# Patient Record
Sex: FEMALE | Race: WHITE | NOT HISPANIC OR LATINO | Employment: OTHER | URBAN - METROPOLITAN AREA
[De-identification: names, ages, dates, MRNs, and addresses within clinical notes are randomized per-mention and may not be internally consistent; named-entity substitution may affect disease eponyms.]

---

## 2018-05-15 ENCOUNTER — TRANSCRIBE ORDERS (OUTPATIENT)
Dept: URGENT CARE | Facility: CLINIC | Age: 73
End: 2018-05-15

## 2018-05-15 ENCOUNTER — APPOINTMENT (OUTPATIENT)
Dept: LAB | Facility: CLINIC | Age: 73
End: 2018-05-15
Payer: COMMERCIAL

## 2018-05-15 DIAGNOSIS — I10 ESSENTIAL HYPERTENSION, BENIGN: Primary | ICD-10-CM

## 2018-05-15 DIAGNOSIS — I10 ESSENTIAL HYPERTENSION, BENIGN: ICD-10-CM

## 2018-05-15 LAB
ALBUMIN SERPL BCP-MCNC: 3.5 G/DL (ref 3.5–5)
ALP SERPL-CCNC: 55 U/L (ref 46–116)
ALT SERPL W P-5'-P-CCNC: 29 U/L (ref 12–78)
ANION GAP SERPL CALCULATED.3IONS-SCNC: 7 MMOL/L (ref 4–13)
AST SERPL W P-5'-P-CCNC: 20 U/L (ref 5–45)
BASOPHILS # BLD AUTO: 0.08 THOUSANDS/ΜL (ref 0–0.1)
BASOPHILS NFR BLD AUTO: 1 % (ref 0–1)
BILIRUB SERPL-MCNC: 0.43 MG/DL (ref 0.2–1)
BUN SERPL-MCNC: 18 MG/DL (ref 5–25)
CALCIUM SERPL-MCNC: 9.4 MG/DL (ref 8.3–10.1)
CHLORIDE SERPL-SCNC: 106 MMOL/L (ref 100–108)
CHOLEST SERPL-MCNC: 175 MG/DL (ref 50–200)
CO2 SERPL-SCNC: 25 MMOL/L (ref 21–32)
CREAT SERPL-MCNC: 0.88 MG/DL (ref 0.6–1.3)
EOSINOPHIL # BLD AUTO: 0.33 THOUSAND/ΜL (ref 0–0.61)
EOSINOPHIL NFR BLD AUTO: 6 % (ref 0–6)
ERYTHROCYTE [DISTWIDTH] IN BLOOD BY AUTOMATED COUNT: 13 % (ref 11.6–15.1)
GFR SERPL CREATININE-BSD FRML MDRD: 66 ML/MIN/1.73SQ M
GLUCOSE P FAST SERPL-MCNC: 100 MG/DL (ref 65–99)
HCT VFR BLD AUTO: 46.5 % (ref 34.8–46.1)
HDLC SERPL-MCNC: 78 MG/DL (ref 40–60)
HGB BLD-MCNC: 15.1 G/DL (ref 11.5–15.4)
IMM GRANULOCYTES # BLD AUTO: 0.01 THOUSAND/UL (ref 0–0.2)
IMM GRANULOCYTES NFR BLD AUTO: 0 % (ref 0–2)
LDLC SERPL CALC-MCNC: 81 MG/DL (ref 0–100)
LYMPHOCYTES # BLD AUTO: 1.87 THOUSANDS/ΜL (ref 0.6–4.47)
LYMPHOCYTES NFR BLD AUTO: 32 % (ref 14–44)
MCH RBC QN AUTO: 30.8 PG (ref 26.8–34.3)
MCHC RBC AUTO-ENTMCNC: 32.5 G/DL (ref 31.4–37.4)
MCV RBC AUTO: 95 FL (ref 82–98)
MONOCYTES # BLD AUTO: 0.64 THOUSAND/ΜL (ref 0.17–1.22)
MONOCYTES NFR BLD AUTO: 11 % (ref 4–12)
NEUTROPHILS # BLD AUTO: 2.93 THOUSANDS/ΜL (ref 1.85–7.62)
NEUTS SEG NFR BLD AUTO: 50 % (ref 43–75)
NONHDLC SERPL-MCNC: 97 MG/DL
NRBC BLD AUTO-RTO: 0 /100 WBCS
PLATELET # BLD AUTO: 309 THOUSANDS/UL (ref 149–390)
PMV BLD AUTO: 11.3 FL (ref 8.9–12.7)
POTASSIUM SERPL-SCNC: 4 MMOL/L (ref 3.5–5.3)
PROT SERPL-MCNC: 7.4 G/DL (ref 6.4–8.2)
RBC # BLD AUTO: 4.9 MILLION/UL (ref 3.81–5.12)
SODIUM SERPL-SCNC: 138 MMOL/L (ref 136–145)
TRIGL SERPL-MCNC: 80 MG/DL
TSH SERPL DL<=0.05 MIU/L-ACNC: 3.01 UIU/ML (ref 0.36–3.74)
WBC # BLD AUTO: 5.86 THOUSAND/UL (ref 4.31–10.16)

## 2018-05-15 PROCEDURE — 36415 COLL VENOUS BLD VENIPUNCTURE: CPT

## 2018-05-15 PROCEDURE — 85025 COMPLETE CBC W/AUTO DIFF WBC: CPT

## 2018-05-15 PROCEDURE — 80061 LIPID PANEL: CPT

## 2018-05-15 PROCEDURE — 80053 COMPREHEN METABOLIC PANEL: CPT

## 2018-05-15 PROCEDURE — 84443 ASSAY THYROID STIM HORMONE: CPT

## 2018-09-21 ENCOUNTER — TRANSCRIBE ORDERS (OUTPATIENT)
Dept: LAB | Facility: CLINIC | Age: 73
End: 2018-09-21

## 2018-09-21 ENCOUNTER — APPOINTMENT (OUTPATIENT)
Dept: LAB | Facility: CLINIC | Age: 73
End: 2018-09-21
Payer: COMMERCIAL

## 2018-09-21 DIAGNOSIS — I10 ESSENTIAL HYPERTENSION, MALIGNANT: Primary | ICD-10-CM

## 2018-09-21 DIAGNOSIS — R73.01 IMPAIRED FASTING GLUCOSE: ICD-10-CM

## 2018-09-21 LAB
ALBUMIN SERPL BCP-MCNC: 3.4 G/DL (ref 3.5–5)
ALP SERPL-CCNC: 54 U/L (ref 46–116)
ALT SERPL W P-5'-P-CCNC: 26 U/L (ref 12–78)
ANION GAP SERPL CALCULATED.3IONS-SCNC: 7 MMOL/L (ref 4–13)
AST SERPL W P-5'-P-CCNC: 21 U/L (ref 5–45)
BASOPHILS # BLD AUTO: 0.08 THOUSANDS/ΜL (ref 0–0.1)
BASOPHILS NFR BLD AUTO: 1 % (ref 0–1)
BILIRUB SERPL-MCNC: 0.64 MG/DL (ref 0.2–1)
BUN SERPL-MCNC: 21 MG/DL (ref 5–25)
CALCIUM SERPL-MCNC: 9 MG/DL (ref 8.3–10.1)
CHLORIDE SERPL-SCNC: 105 MMOL/L (ref 100–108)
CHOLEST SERPL-MCNC: 175 MG/DL (ref 50–200)
CO2 SERPL-SCNC: 27 MMOL/L (ref 21–32)
CREAT SERPL-MCNC: 0.84 MG/DL (ref 0.6–1.3)
EOSINOPHIL # BLD AUTO: 0.32 THOUSAND/ΜL (ref 0–0.61)
EOSINOPHIL NFR BLD AUTO: 5 % (ref 0–6)
ERYTHROCYTE [DISTWIDTH] IN BLOOD BY AUTOMATED COUNT: 12.6 % (ref 11.6–15.1)
GFR SERPL CREATININE-BSD FRML MDRD: 70 ML/MIN/1.73SQ M
GLUCOSE P FAST SERPL-MCNC: 94 MG/DL (ref 65–99)
HCT VFR BLD AUTO: 47.8 % (ref 34.8–46.1)
HDLC SERPL-MCNC: 75 MG/DL (ref 40–60)
HGB BLD-MCNC: 15.8 G/DL (ref 11.5–15.4)
IMM GRANULOCYTES # BLD AUTO: 0.01 THOUSAND/UL (ref 0–0.2)
IMM GRANULOCYTES NFR BLD AUTO: 0 % (ref 0–2)
LDLC SERPL CALC-MCNC: 80 MG/DL (ref 0–100)
LYMPHOCYTES # BLD AUTO: 1.76 THOUSANDS/ΜL (ref 0.6–4.47)
LYMPHOCYTES NFR BLD AUTO: 29 % (ref 14–44)
MCH RBC QN AUTO: 31.4 PG (ref 26.8–34.3)
MCHC RBC AUTO-ENTMCNC: 33.1 G/DL (ref 31.4–37.4)
MCV RBC AUTO: 95 FL (ref 82–98)
MONOCYTES # BLD AUTO: 0.64 THOUSAND/ΜL (ref 0.17–1.22)
MONOCYTES NFR BLD AUTO: 11 % (ref 4–12)
NEUTROPHILS # BLD AUTO: 3.23 THOUSANDS/ΜL (ref 1.85–7.62)
NEUTS SEG NFR BLD AUTO: 54 % (ref 43–75)
NONHDLC SERPL-MCNC: 100 MG/DL
NRBC BLD AUTO-RTO: 0 /100 WBCS
PLATELET # BLD AUTO: 294 THOUSANDS/UL (ref 149–390)
PMV BLD AUTO: 10.9 FL (ref 8.9–12.7)
POTASSIUM SERPL-SCNC: 4.1 MMOL/L (ref 3.5–5.3)
PROT SERPL-MCNC: 7.2 G/DL (ref 6.4–8.2)
RBC # BLD AUTO: 5.03 MILLION/UL (ref 3.81–5.12)
SODIUM SERPL-SCNC: 139 MMOL/L (ref 136–145)
TRIGL SERPL-MCNC: 99 MG/DL
TSH SERPL DL<=0.05 MIU/L-ACNC: 3.08 UIU/ML (ref 0.36–3.74)
WBC # BLD AUTO: 6.04 THOUSAND/UL (ref 4.31–10.16)

## 2018-09-21 PROCEDURE — 80053 COMPREHEN METABOLIC PANEL: CPT

## 2018-09-21 PROCEDURE — 36415 COLL VENOUS BLD VENIPUNCTURE: CPT

## 2018-09-21 PROCEDURE — 80061 LIPID PANEL: CPT

## 2018-09-21 PROCEDURE — 85025 COMPLETE CBC W/AUTO DIFF WBC: CPT

## 2018-09-21 PROCEDURE — 84443 ASSAY THYROID STIM HORMONE: CPT

## 2018-09-21 PROCEDURE — 83036 HEMOGLOBIN GLYCOSYLATED A1C: CPT

## 2018-09-22 LAB
EST. AVERAGE GLUCOSE BLD GHB EST-MCNC: 105 MG/DL
HBA1C MFR BLD: 5.3 % (ref 4.2–6.3)

## 2021-10-05 ENCOUNTER — OFFICE VISIT (OUTPATIENT)
Dept: URGENT CARE | Facility: CLINIC | Age: 76
End: 2021-10-05
Payer: COMMERCIAL

## 2021-10-05 VITALS
HEIGHT: 62 IN | OXYGEN SATURATION: 98 % | TEMPERATURE: 98.2 F | BODY MASS INDEX: 28.89 KG/M2 | DIASTOLIC BLOOD PRESSURE: 65 MMHG | HEART RATE: 78 BPM | RESPIRATION RATE: 15 BRPM | SYSTOLIC BLOOD PRESSURE: 137 MMHG | WEIGHT: 157 LBS

## 2021-10-05 DIAGNOSIS — L08.9 LACERATION OF FINGER WITH INFECTION, INITIAL ENCOUNTER: Primary | ICD-10-CM

## 2021-10-05 DIAGNOSIS — S61.219A LACERATION OF FINGER WITH INFECTION, INITIAL ENCOUNTER: Primary | ICD-10-CM

## 2021-10-05 PROCEDURE — 99214 OFFICE O/P EST MOD 30 MIN: CPT | Performed by: NURSE PRACTITIONER

## 2021-10-05 PROCEDURE — S9088 SERVICES PROVIDED IN URGENT: HCPCS | Performed by: NURSE PRACTITIONER

## 2021-10-05 RX ORDER — CLINDAMYCIN HYDROCHLORIDE 300 MG/1
300 CAPSULE ORAL 3 TIMES DAILY
Qty: 21 CAPSULE | Refills: 0 | Status: SHIPPED | OUTPATIENT
Start: 2021-10-05 | End: 2021-10-12

## 2021-10-05 RX ORDER — LOSARTAN POTASSIUM 50 MG/1
50 TABLET ORAL DAILY
COMMUNITY
Start: 2021-09-06

## 2021-10-07 ENCOUNTER — TELEPHONE (OUTPATIENT)
Dept: URGENT CARE | Facility: CLINIC | Age: 76
End: 2021-10-07

## 2022-06-13 NOTE — PROGRESS NOTES
PT Evaluation     Today's date: 2022  Patient name: Ricco Pennington  : 1945  MRN: [id number]  Referring provider: ARACELIS Jurado  Dx:   Encounter Diagnosis     ICD-10-CM    1  Cervicalgia  M54 2                   Assessment  Assessment details: The patient is a 79 y/o female who presents to PT with diagnosis of cervicalgia  She has complaints of intermittent pain along the R side of her neck  She denies any radiating arm pain, denies UE N&T  Her BUE ROM and strength is WFL at this time  She demonstrates deficits with decreased C/S ROM and strength, decreased postural awareness, decreased flexibility, muscle tightness and pain with completing her ADLs and tasks at home  She remains I with all her ADLs though she has pain with completing them  She has increased tightness and pain with activities in which she has to turn her head, such as when driving the car  No TTP is noted along C/S and periscapular muscles though muscle tightness is noted along R side  No difficulty sleeping at this time  The patient would benefit from continued PT to address deficits and improve function  Tx to include ROM, stretching, strengthening, modalities, HEP, pt education, postural ed, lifting/body mechanics, neuro re-ed, balance/proprioception Te, MT and equipment  Impairments: abnormal or restricted ROM, activity intolerance, impaired physical strength, lacks appropriate home exercise program, pain with function, poor posture  and poor body mechanics  Other impairment: decreased flexibility  Understanding of Dx/Px/POC: good   Prognosis: good    Goals  STGs:  1  Initiate and complete HEP with verbal cues  2   Improve C/S ROM by 5-10 degrees in 4 weeks  3   Decrease C/S pain by > 25% in 4 weeks  LTGs:  1  Patient to be I with HEP in 6 weeks  2   Improve C/S ROM to WNL t/o in 6 weeks to improve function  3   Decrease neck pain to < or = to 1-2/10 with activity in 6 weeks to improve function    4  Postural control is improved to maximal level of function in 6 weeks  5   Recreational performance is improved to PLOF in 6 weeks  6   ADL performance is improved to PLOF in 6 weeks  Plan  Plan details: Modalities and therapy interventions prn  Patient would benefit from: skilled physical therapy  Planned modality interventions: cryotherapy and thermotherapy: hydrocollator packs  Planned therapy interventions: manual therapy, behavior modification, body mechanics training, neuromuscular re-education, patient education, postural training, self care, strengthening, stretching, therapeutic activities, therapeutic exercise, flexibility and home exercise program  Frequency: 2x week  Duration in weeks: 6  Plan of Care beginning date: 2022  Plan of Care expiration date: 2022  Treatment plan discussed with: patient        Subjective Evaluation    History of Present Illness  Mechanism of injury: The patient states that she developed neck pain about one year ago in April  She notes that it had been getting hard to turn her neck and her R shoulder had been "drooping down "  She notes that the pain had been on and off and had started to get more consistent last fall  She had spent the winter in [place] and the doctor out there wasn't taking any more patients so she had waited until she came home to go to the doctor  She had seen her PCP the end of May  She had x-rays taken, per patient they showed arthritis  She was also given a muscle relaxer which she had started to take at bedtime  She notes that this has helped with the stiffness and pain in her neck  She will be going back to see her PCP as needed for her follow up appointment  The patient states that she has neck pain along the R side of her neck, no L side neck pain  She denies any radiating arm pain, denies N&T into RUE      Pain  At best pain ratin  At worst pain rating: 3  Location: Neck Pain - R side   Quality: dull ache and tight    Social Support    Employment status: not working  Hand dominance: right      Diagnostic Tests  X-ray: abnormal (Degenerative changes C4-C6)  Patient Goals  Patient goals for therapy: increased motion, decreased pain and increased strength  Patient goal: "To get some exercises to do at home "         Objective     Concurrent Complaints  Negative for disturbed sleep and headaches    Static Posture     Head  Forward  Shoulders  Rounded  Postural Observations  Seated posture: fair        Palpation   Left   No palpable tenderness to the cervical interspinals, cervical paraspinals, levator scapulae, middle trapezius, rhomboids, suboccipitals and upper trapezius  Right   No palpable tenderness to the cervical interspinals, cervical paraspinals, levator scapulae, middle trapezius, rhomboids, suboccipitals and upper trapezius  Hypertonic in the levator scapulae and upper trapezius  Active Range of Motion   Cervical/Thoracic Spine       Cervical    Flexion: 40 degrees   Extension: 45 degrees      Left lateral flexion: 30 degrees      Right lateral flexion: 30 degrees      Left rotation: 50 degrees  Right rotation: 50 degrees         Left Shoulder   Flexion: WFL  External rotation BTH: WFL    Right Shoulder   Flexion: WFL  External rotation BTH: WFL    Strength/Myotome Testing     Left Shoulder     Planes of Motion   Flexion: WFL   [facility]   Internal rotation at 0°: WFL     Right Shoulder     Planes of Motion   Flexion: [facility]   External rotation at 0°: [facility]   Internal rotation at 0°: [facility]     Tests   Cervical   Positive neck flexor muscle endurance test   Negative vertical compression and cervical distraction  Lumbar   Negative vertical compression     Neuro Exam:     Headaches   Patient reports headaches: No                  Precautions: HTN       Manuals 6/14       IASTM R Neck/UT region                                Neuro Re-Ed         MTP/LTP        [employer] on Wall Postural Ed  ML               Ther Ex        UBE Retro        C/S ROM        Shrugs/Rolls HEP       Retractions HEP       UT Stretch HEP       Levator Stretch HEP       Doorway Stretch HEP       [place] ER w/TBand        Supine Punch        S/L ER & Abd        Wall Pushups                                Ther Activity                        Gait Training                        Modalities        HP/CP prn                           Access Code: [email or web address]  URL: [email or web address]  Date: 06/14/2022  Prepared by: Isha Eastern    Exercises  · Seated Shoulder Shrugs - 1 x daily - 7 x weekly - 1 sets - 10 reps  · Seated Scapular Retraction - 1 x daily - 7 x weekly - 1 sets - 10 reps  · Doorway Pec Stretch at 60 Elevation - 1 x daily - 7 x weekly - 1 sets - 5 reps - 20" hold  · Seated Upper Trapezius Stretch - 1 x daily - 7 x weekly - 1 sets - 5 reps - 20" hold  · Seated Levator Scapulae Stretch - 1 x daily - 7 x weekly - 1 sets - 5 reps - 20" hold

## 2022-06-14 ENCOUNTER — EVALUATION (OUTPATIENT)
Dept: PHYSICAL THERAPY | Facility: CLINIC | Age: 77
End: 2022-06-14
Payer: COMMERCIAL

## 2022-06-14 DIAGNOSIS — M54.2 CERVICALGIA: Primary | ICD-10-CM

## 2022-06-14 PROCEDURE — 97161 PT EVAL LOW COMPLEX 20 MIN: CPT | Performed by: PHYSICAL THERAPIST

## 2022-06-14 PROCEDURE — 97112 NEUROMUSCULAR REEDUCATION: CPT | Performed by: PHYSICAL THERAPIST

## 2022-06-14 PROCEDURE — 97535 SELF CARE MNGMENT TRAINING: CPT | Performed by: PHYSICAL THERAPIST

## 2022-06-14 NOTE — LETTER
Elizabeth 15, 2022    [address] ,  [facility] [address]    Patient: Sg Lindsay   YOB: 1945   Date of Visit: 2022     Encounter Diagnosis     ICD-10-CM    1  Cervicalgia  M54 2        Dear Dr Eric Cooper: Thank you for your recent referral of Sg Lindsay  Please review the attached evaluation summary from Mirella's recent visit  Please verify that you agree with the plan of care by signing the attached order  If you have any questions or concerns, please do not hesitate to call  I sincerely appreciate the opportunity to share in the care of one of your patients and hope to have another opportunity to work with you in the near future  Sincerely,    Donna Tam, PT      Referring Provider:      I certify that I have read the below Plan of Care and certify the need for these services furnished under this plan of treatment while under my care  [address] ,  [facility] [address]  Via Fax: [phone]          PT Evaluation     Today's date: 2022  Patient name: Sg Lindsay  : 1945  MRN: [id number]  Referring provider: ARACELIS Champagne  Dx:   Encounter Diagnosis     ICD-10-CM    1  Cervicalgia  M54 2                   Assessment  Assessment details: The patient is a 79 y/o female who presents to PT with diagnosis of cervicalgia  She has complaints of intermittent pain along the R side of her neck  She denies any radiating arm pain, denies UE N&T  Her BUE ROM and strength is WFL at this time  She demonstrates deficits with decreased C/S ROM and strength, decreased postural awareness, decreased flexibility, muscle tightness and pain with completing her ADLs and tasks at home  She remains I with all her ADLs though she has pain with completing them    She has increased tightness and pain with activities in which she has to turn her head, such as when driving the car  No TTP is noted along C/S and periscapular muscles though muscle tightness is noted along R side  No difficulty sleeping at this time  The patient would benefit from continued PT to address deficits and improve function  Tx to include ROM, stretching, strengthening, modalities, HEP, pt education, postural ed, lifting/body mechanics, neuro re-ed, balance/proprioception Te, MT and equipment  Impairments: abnormal or restricted ROM, activity intolerance, impaired physical strength, lacks appropriate home exercise program, pain with function, poor posture  and poor body mechanics  Other impairment: decreased flexibility  Understanding of Dx/Px/POC: good   Prognosis: good    Goals  STGs:  1  Initiate and complete HEP with verbal cues  2   Improve C/S ROM by 5-10 degrees in 4 weeks  3   Decrease C/S pain by > 25% in 4 weeks  LTGs:  1  Patient to be I with HEP in 6 weeks  2   Improve C/S ROM to WNL t/o in 6 weeks to improve function  3   Decrease neck pain to < or = to 1-2/10 with activity in 6 weeks to improve function  4   Postural control is improved to maximal level of function in 6 weeks  5   Recreational performance is improved to PLOF in 6 weeks  6   ADL performance is improved to PLOF in 6 weeks  Plan  Plan details: Modalities and therapy interventions prn      Patient would benefit from: skilled physical therapy  Planned modality interventions: cryotherapy and thermotherapy: hydrocollator packs  Planned therapy interventions: manual therapy, behavior modification, body mechanics training, neuromuscular re-education, patient education, postural training, self care, strengthening, stretching, therapeutic activities, therapeutic exercise, flexibility and home exercise program  Frequency: 2x week  Duration in weeks: 6  Plan of Care beginning date: 6/14/2022  Plan of Care expiration date: 7/26/2022  Treatment plan discussed with: patient        Subjective Evaluation    History of Present Illness  Mechanism of injury: The patient states that she developed neck pain about one year ago in April  She notes that it had been getting hard to turn her neck and her R shoulder had been "drooping down "  She notes that the pain had been on and off and had started to get more consistent last fall  She had spent the winter in [place] and the doctor out there wasn't taking any more patients so she had waited until she came home to go to the doctor  She had seen her PCP the end of May  She had x-rays taken, per patient they showed arthritis  She was also given a muscle relaxer which she had started to take at bedtime  She notes that this has helped with the stiffness and pain in her neck  She will be going back to see her PCP as needed for her follow up appointment  The patient states that she has neck pain along the R side of her neck, no L side neck pain  She denies any radiating arm pain, denies N&T into RUE  Pain  At best pain ratin  At worst pain rating: 3  Location: Neck Pain - R side   Quality: dull ache and tight    Social Support    Employment status: not working  Hand dominance: right      Diagnostic Tests  X-ray: abnormal (Degenerative changes C4-C6)  Patient Goals  Patient goals for therapy: increased motion, decreased pain and increased strength  Patient goal: "To get some exercises to do at home "         Objective     Concurrent Complaints  Negative for disturbed sleep and headaches    Static Posture     Head  Forward  Shoulders  Rounded  Postural Observations  Seated posture: fair        Palpation   Left   No palpable tenderness to the cervical interspinals, cervical paraspinals, levator scapulae, middle trapezius, rhomboids, suboccipitals and upper trapezius       Right   No palpable tenderness to the cervical interspinals, cervical paraspinals, levator scapulae, middle trapezius, rhomboids, suboccipitals and upper trapezius  Hypertonic in the levator scapulae and upper trapezius  Active Range of Motion   Cervical/Thoracic Spine       Cervical    Flexion: 40 degrees   Extension: 45 degrees      Left lateral flexion: 30 degrees      Right lateral flexion: 30 degrees      Left rotation: 50 degrees  Right rotation: 50 degrees         Left Shoulder   Flexion: WFL  External rotation BTH: WFL    Right Shoulder   Flexion: WFL  External rotation BTH: WFL    Strength/Myotome Testing     Left Shoulder     Planes of Motion   Flexion: WFL   [facility] 0°: [facility]   Internal rotation at 0°: WFL     Right Shoulder     Planes of Motion   Flexion: [facility]   External rotation at 0°: [facility]   Internal rotation at 0°: [facility]     Tests   Cervical   Positive neck flexor muscle endurance test   Negative vertical compression and cervical distraction  Lumbar   Negative vertical compression  Neuro Exam:     Headaches   Patient reports headaches: No                  Precautions: HTN       Manuals 6/14       IASTM R Neck/UT region                                Neuro Re-Ed         MTP/LTP        [employer] on [employer]                                Postural Ed  ML               Ther Ex        UBE Retro        C/S ROM        Shrugs/Rolls HEP       Retractions HEP       UT Stretch HEP       Levator Stretch HEP       Doorway Stretch HEP       D[place] ER w/TBand        Supine Punch        S/L ER & Abd        Wall Pushups                                Ther Activity                        Gait Training                        Modalities        HP/CP prn                           Access Code: [email or web address]  URL: [email or web address]  Date: 06/14/2022  Prepared by: Sigrid Gentile    Exercises  · Seated Shoulder Shrugs - 1 x daily - 7 x weekly - 1 sets - 10 reps  · Seated Scapular Retraction - 1 x daily - 7 x weekly - 1 sets - 10 reps  · Doorway Pec Stretch at 60 Elevation - 1 x daily - 7 x weekly - 1 sets - 5 reps - 20" hold  · Seated Upper Trapezius Stretch - 1 x daily - 7 x weekly - 1 sets - 5 reps - 20" hold  · Seated Levator Scapulae Stretch - 1 x daily - 7 x weekly - 1 sets - 5 reps - 20" hold

## 2022-06-17 ENCOUNTER — OFFICE VISIT (OUTPATIENT)
Dept: PHYSICAL THERAPY | Facility: CLINIC | Age: 77
End: 2022-06-17
Payer: COMMERCIAL

## 2022-06-17 DIAGNOSIS — M54.2 CERVICALGIA: Primary | ICD-10-CM

## 2022-06-17 PROCEDURE — 97110 THERAPEUTIC EXERCISES: CPT

## 2022-06-17 PROCEDURE — 97140 MANUAL THERAPY 1/> REGIONS: CPT

## 2022-06-17 PROCEDURE — 97112 NEUROMUSCULAR REEDUCATION: CPT

## 2022-06-17 NOTE — PROGRESS NOTES
Daily Note     Today's date: 2022  Patient name: Luisa Mixon  : 1945  MRN: [id number]  Referring provider: ARACELIS Beasley  Dx:   Encounter Diagnosis     ICD-10-CM    1  Cervicalgia  M54 2                   Subjective: Pt reported that her neck continues to be very sore  Has been compliant with her HEP  She denies all contraindications to IASTM prior to Tx  Objective: See treatment diary below      Assessment: Initiated multiple exercises as outlines in pt POC  Tolerated treatment well  Patient demonstrated fatigue post treatment, exhibited good technique with therapeutic exercises and would benefit from continued PT  No adverse affect to IASTM post Tx  Plan: Continue per plan of care  Progress treatment as tolerated            Precautions: HTN       Manuals       IASTM R Neck/UT region  TM                              Neuro Re-Ed         MTP/LTP  GTB 5" x10 ea      [employer] on Wall                                Postural Ed  ML               Ther Ex        UBE Retro  120 RPM x6 min      C/S ROM        Shrugs/Rolls HEP x20      Retractions HEP 5" x20      UT Stretch HEP 10" x5 ea      Levator Stretch HEP 10" x5 ea      Doorway Stretch HEP 20" x4       Dew[place]ne ER w/TBand  GTB 5" x10      Supine Punch  W/ cane 5" x20      S/L ER & Abd  S/L ER 5" x20      Wall Pushups                                Ther Activity                        Gait Training                        Modalities        HP/CP prn

## 2022-06-20 ENCOUNTER — OFFICE VISIT (OUTPATIENT)
Dept: PHYSICAL THERAPY | Facility: CLINIC | Age: 77
End: 2022-06-20
Payer: COMMERCIAL

## 2022-06-20 DIAGNOSIS — M54.2 CERVICALGIA: Primary | ICD-10-CM

## 2022-06-20 PROCEDURE — 97110 THERAPEUTIC EXERCISES: CPT | Performed by: PHYSICAL THERAPIST

## 2022-06-20 PROCEDURE — 97140 MANUAL THERAPY 1/> REGIONS: CPT | Performed by: PHYSICAL THERAPIST

## 2022-06-20 NOTE — PROGRESS NOTES
Daily Note     Today's date: 2022  Patient name: Jeannette Hammonds  : 1945  MRN: [id number]  Referring provider: ARACELIS Pritchett  Dx:   Encounter Diagnosis     ICD-10-CM    1  Cervicalgia  M54 2                   Subjective: Patient reports she has been taking the muscle relaxer as prescribed and this seems to be helping  Objective: See treatment diary below      Assessment: Tolerated treatment well  Patient requires cueing and demonstration for recall of exercises  Patient demonstrated fatigue post treatment, exhibited good technique with therapeutic exercises and would benefit from continued PT      Plan: Continue per plan of care  Progress treatment as tolerated            Precautions: HTN       Manuals      IASTM R Neck/UT region  TM KG                             Neuro Re-Ed         MTP/LTP  GTB 5" x10 ea Green TB 5" x10 ea     Ball Roll on Wall                                Postural Ed  ML               Ther Ex        UBE Retro  120 RPM x6 min 120 rpm 4'fwd/4'retro     C/S ROM        Shrugs/Rolls HEP x20      Retractions HEP 5" x20 5" x20     UT Stretch HEP 10" x5 ea 10"x5 ea     Levator Stretch HEP 10" x5 ea 10" x5 ea     Doorway Stretch HEP 20" x4  20"x4     Dew[place]ne ER w/TBand  GTB 5" x10 Green TB 5" x10     Supine Punch  W/ cane 5" x20 W/cane 5" x20     S/L ER & Abd  S/L ER 5" x20 x20 ea ER and abd     Wall Pushups                                Ther Activity                        Gait Training                        Modalities        HP/CP prn

## 2022-06-27 ENCOUNTER — OFFICE VISIT (OUTPATIENT)
Dept: PHYSICAL THERAPY | Facility: CLINIC | Age: 77
End: 2022-06-27
Payer: COMMERCIAL

## 2022-06-27 DIAGNOSIS — M54.2 CERVICALGIA: Primary | ICD-10-CM

## 2022-06-27 PROCEDURE — 97140 MANUAL THERAPY 1/> REGIONS: CPT | Performed by: PHYSICAL THERAPIST

## 2022-06-27 PROCEDURE — 97112 NEUROMUSCULAR REEDUCATION: CPT | Performed by: PHYSICAL THERAPIST

## 2022-06-27 PROCEDURE — 97110 THERAPEUTIC EXERCISES: CPT | Performed by: PHYSICAL THERAPIST

## 2022-06-27 NOTE — PROGRESS NOTES
Daily Note     Today's date: 2022  Patient name: Deepa Allen  : 1945  MRN: [id number]  Referring provider: ARACELIS Becerra  Dx:   Encounter Diagnosis     ICD-10-CM    1  Cervicalgia  M54 2                   Subjective: Patient reports she feels "almost back to normal" with the exception of pain when there is rainy weather  Objective: See treatment diary below      Assessment: Tolerated treatment well  Increased reps for TB resistance exercises today with good tolerance  Patient demonstrated fatigue post treatment, exhibited good technique with therapeutic exercises and would benefit from continued PT      Plan: Continue per plan of care  Progress note during next visit  Potential discharge next visit  Progress treatment as tolerated            Precautions: HTN       Manuals     IASTM R Neck/UT region  TM KG KG  b/l                            Neuro Re-Ed         MTP/LTP  GTB 5" x10 ea Green TB 5" x10 ea Green TB  5" 2x10    Ball Roll on Wall                                Postural Ed  ML               Ther Ex        UBE Retro  120 RPM x6 min 120 rpm 4'fwd/4'retro 120 rpm  4'fwd/4'retro    C/S ROM        Shrugs/Rolls HEP x20      Retractions HEP 5" x20 5" x20     UT Stretch HEP 10" x5 ea 10"x5 ea 10"x5 ea    Levator Stretch HEP 10" x5 ea 10" x5 ea 10"x5 ea    Doorway Stretch HEP 20" x4  20"x4 20"x4    Dewayne ER w/TBand  GTB 5" x10 Green TB 5" x10 Green TB  5" 2x10    Supine Punch  W/ cane 5" x20 W/cane 5" x20 W/cane  5" x20    S/L ER & Abd  S/L ER 5" x20 x20 ea ER and abd 1# x20 ea ER and abd    Wall Pushups                                Ther Activity                        Gait Training                        Modalities        HP/CP prn

## 2022-06-30 NOTE — PROGRESS NOTES
PT Discharge    Today's date: 2022  Patient name: Matthew Hatchet  : 1945  MRN: [id number]  Referring provider: ARACELIS Moya  Dx:   Encounter Diagnosis     ICD-10-CM    1  Cervicalgia  M54 2                   Assessment  Assessment details: The patient has been seen in PT for a total of 5 visits with good PT attendance noted  She has made improvements since [facility] and has met most of her goals  She has minimal complaints of pain  At this time she is happy with her progress in PT and she wishes to continue with her HEP  She has demonstrated understanding of HEP for ROM, stretching, strengthening and flexibility  She may continue to improve through continued HEP  D/C PT secondary to most goals met and patient request   D/C PT  Impairments: abnormal or restricted ROM, activity intolerance, impaired physical strength, pain with function and poor posture   Other impairment: decreased flexibility  Understanding of Dx/Px/POC: good   Prognosis: good    Goals  STGs:  1  Initiate and complete HEP with verbal cues  - met  2  Improve C/S ROM by 5-10 degrees in 4 weeks  - met  3  Decrease C/S pain by > 25% in 4 weeks  - met  LTGs:  1  Patient to be I with HEP in 6 weeks  - met  2  Improve C/S ROM to WNL t/o in 6 weeks to improve function  - partially met  3  Decrease neck pain to < or = to 1-2/10 with activity in 6 weeks to improve function  - met  4  Postural control is improved to maximal level of function in 6 weeks  - met  5  Recreational performance is improved to PLOF in 6 weeks  - met  6  ADL performance is improved to PLOF in 6 weeks  - met      Plan  Plan details: Modalities and therapy interventions prn      Planned modality interventions: cryotherapy and thermotherapy: hydrocollator packs  Planned therapy interventions: manual therapy, behavior modification, body mechanics training, neuromuscular re-education, patient education, postural training, self care, strengthening, stretching, therapeutic activities, therapeutic exercise, flexibility and home exercise program  Plan of Care beginning date: 2022  Plan of Care expiration date: 2022  Treatment plan discussed with: patient        Subjective Evaluation    History of Present Illness  Mechanism of injury: The patient states that she developed neck pain about one year ago in April  She notes that it had been getting hard to turn her neck and her R shoulder had been "drooping down "  She notes that the pain had been on and off and had started to get more consistent last   She had spent the winter in [place] and the doctor out there wasn't taking any more patients so she had waited until she came home to go to the doctor  She had seen her PCP the end of May  She had x-rays taken, per patient they showed arthritis  She was also given a muscle relaxer which she had started to take at bedtime  She notes that this has helped with the stiffness and pain in her neck  She will be going back to see her PCP as needed for her follow up appointment  The patient states that she has neck pain along the R side of her neck, no L side neck pain  She denies any radiating arm pain, denies N&T into RUE  UPDATE 22:  The patient states that she is doing good and has minimal pain  She notes that she still gets pain with change in weather or stiff when sitting for too long at her computer  Reports compliance with her HEP  Notes she is happy with her progress in PT and would like to continue with her HEP  She does not have a follow up appointment with the doctor, to see them as needed      Pain  At best pain ratin  At worst pain ratin  Location: Neck Pain - R side   Quality: dull ache and tight    Social Support    Employment status: not working  Hand dominance: right      Diagnostic Tests  X-ray: abnormal (Degenerative changes C4-C6)  Patient Goals  Patient goals for therapy: increased motion, decreased pain and increased strength  Patient goal: "To get some exercises to do at home "         Objective     Concurrent Complaints  Negative for disturbed sleep and headaches    Postural Observations  Seated posture: good        Palpation   Left   No palpable tenderness to the cervical interspinals, cervical paraspinals, levator scapulae, middle trapezius, rhomboids, suboccipitals and upper trapezius  Right   No palpable tenderness to the cervical interspinals, cervical paraspinals, levator scapulae, middle trapezius, rhomboids, suboccipitals and upper trapezius  Active Range of Motion   Cervical/Thoracic Spine       Cervical    Flexion: 45 degrees   Extension: 45 degrees      Left lateral flexion: 35 degrees      Right lateral flexion: 35 degrees      Left rotation: 60 degrees  Right rotation: 60 degrees         Left Shoulder   Flexion: WFL  External rotation BTH: WFL    Right Shoulder   Flexion: WFL  External rotation BTH: [facility]    Strength/Myotome Testing     Left Shoulder     Planes of Motion   Flexion: WFL   External [facility] at 0°[facility] [place]   Internal rotation at 0°: WFL     Right Shoulder     Planes of Motion   Flexion: [facility]   External rotation at 0°: [facility]   Internal rotation at 0°: [facility] [place]     Tests   Cervical   Positive neck flexor muscle endurance test   Negative vertical compression and cervical distraction  Lumbar   Negative vertical compression     Neuro Exam:     Headaches   Patient reports headaches: No                  Precautions: HTN       Manuals 6/14 6/17 6/20 6/27 7/5   IASTM R Neck/UT region  TM KG KG  b/l ML  [place]                           Neuro Re-Ed         MTP/LTP  GTB 5" x10 ea Green TB 5" x10 ea Green TB  5" 2x10 Green TB  5" 2 x 10   Ball Roll on Wall                                Postural Ed  ML    ML           Ther Ex        UBE Retro  120 RPM x6 min 120 rpm 4'fwd/4'retro 120 rpm  4'fwd/4'retro 120 rpm  For 4'/Back 4'   C/S ROM        Shrugs/Rolls HEP x20      Retractions HEP 5" x20 5" x20     UT Stretch HEP 10" x5 ea 10"x5 ea 10"x5 ea 10" x 5 [place]   Levator Stretch HEP 10" x5 ea 10" x5 ea 10"x5 ea 10" x 5 [place]   Doorway Stretch HEP 20" x4  20"x4 20"x4 20" x 4   [place] ER w/TBand  GTB 5" x10 Green TB 5" x10 Green TB  5" 2x10 Green TB  5" 2 x 10   Supine Punch  W/ cane 5" x20 W/cane 5" x20 W/cane  5" x20 W/cane  5" x 20   S/L ER & Abd  S/L ER 5" x20 x20 ea ER and abd 1# x20 ea ER and abd 1# 20x ea ER and Abd   Wall Pushups                                Ther Activity                        Gait Training                        Modalities        HP/CP prn                             Access Code: [email or web address]  URL: [email or web address]  Date: 06/14/2022  Prepared by: Nisa Knott    Exercises  · Seated Shoulder Shrugs - 1 x daily - 7 x weekly - 1 sets - 10 reps  · Seated Scapular Retraction - 1 x daily - 7 x weekly - 1 sets - 10 reps  · Doorway Pec Stretch at 60 Elevation - 1 x daily - 7 x weekly - 1 sets - 5 reps - 20" hold  · Seated Upper Trapezius Stretch - 1 x daily - 7 x weekly - 1 sets - 5 reps - 20" hold  · Seated Levator Scapulae Stretch - 1 x daily - 7 x weekly - 1 sets - 5 reps - 20" hold

## 2022-07-01 ENCOUNTER — APPOINTMENT (OUTPATIENT)
Dept: PHYSICAL THERAPY | Facility: CLINIC | Age: 77
End: 2022-07-01
Payer: COMMERCIAL

## 2022-07-05 ENCOUNTER — OFFICE VISIT (OUTPATIENT)
Dept: PHYSICAL THERAPY | Facility: CLINIC | Age: 77
End: 2022-07-05
Payer: COMMERCIAL

## 2022-07-05 DIAGNOSIS — M54.2 CERVICALGIA: Primary | ICD-10-CM

## 2022-07-05 PROCEDURE — 97110 THERAPEUTIC EXERCISES: CPT | Performed by: PHYSICAL THERAPIST

## 2022-07-05 PROCEDURE — 97140 MANUAL THERAPY 1/> REGIONS: CPT | Performed by: PHYSICAL THERAPIST
